# Patient Record
Sex: FEMALE | Race: BLACK OR AFRICAN AMERICAN | NOT HISPANIC OR LATINO | Employment: FULL TIME | URBAN - METROPOLITAN AREA
[De-identification: names, ages, dates, MRNs, and addresses within clinical notes are randomized per-mention and may not be internally consistent; named-entity substitution may affect disease eponyms.]

---

## 2024-07-11 ENCOUNTER — TELEPHONE (OUTPATIENT)
Dept: OBSTETRICS AND GYNECOLOGY | Facility: CLINIC | Age: 29
End: 2024-07-11
Payer: MEDICAID

## 2024-07-11 NOTE — TELEPHONE ENCOUNTER
I called patient for NOB information, she states that she will give us a call back.     ----- Message from Luis Alfredo Brown MA sent at 7/10/2024  4:22 PM CDT -----  Contact: self    ----- Message -----  From: Deepa Iyer  Sent: 7/10/2024   2:08 PM CDT  To: Raquel Esparza A Staff    Patient is requesting a call back regarding scheduling Nob appt . Please call back at [phone]

## 2024-07-11 NOTE — TELEPHONE ENCOUNTER
Called patient to get more information for NOB request. I explained protocol to patient and will give the request over to the doctor to review. Patient verbalized understanding.       ----- Message from Charlene Su sent at 7/11/2024  4:06 PM CDT -----  Patient returning miss call please call her back at [phone]

## 2024-07-12 DIAGNOSIS — Z34.91 FIRST TRIMESTER PREGNANCY: Primary | ICD-10-CM

## 2024-07-17 ENCOUNTER — PROCEDURE VISIT (OUTPATIENT)
Dept: OBSTETRICS AND GYNECOLOGY | Facility: CLINIC | Age: 29
End: 2024-07-17
Payer: MEDICAID

## 2024-07-17 DIAGNOSIS — Z34.91 FIRST TRIMESTER PREGNANCY: ICD-10-CM

## 2024-07-17 DIAGNOSIS — Z34.91 FIRST TRIMESTER PREGNANCY: Primary | ICD-10-CM

## 2024-07-23 DIAGNOSIS — Z34.91 FIRST TRIMESTER PREGNANCY: Primary | ICD-10-CM

## 2024-07-29 RX ORDER — CEPHALEXIN 500 MG/1
500 CAPSULE ORAL EVERY 6 HOURS
Qty: 28 CAPSULE | Refills: 0 | Status: SHIPPED | OUTPATIENT
Start: 2024-07-29